# Patient Record
Sex: MALE | Race: WHITE | Employment: FULL TIME | URBAN - METROPOLITAN AREA
[De-identification: names, ages, dates, MRNs, and addresses within clinical notes are randomized per-mention and may not be internally consistent; named-entity substitution may affect disease eponyms.]

---

## 2021-05-22 ENCOUNTER — HOSPITAL ENCOUNTER (EMERGENCY)
Age: 31
Discharge: HOME | End: 2021-05-22
Attending: EMERGENCY MEDICINE
Payer: COMMERCIAL

## 2021-05-22 VITALS
TEMPERATURE: 97.6 F | OXYGEN SATURATION: 97 % | RESPIRATION RATE: 14 BRPM | HEART RATE: 98 BPM | SYSTOLIC BLOOD PRESSURE: 134 MMHG | WEIGHT: 220 LBS | BODY MASS INDEX: 34.53 KG/M2 | DIASTOLIC BLOOD PRESSURE: 83 MMHG | HEIGHT: 67 IN

## 2021-05-22 DIAGNOSIS — S61.411A LACERATION OF RIGHT HAND WITHOUT FOREIGN BODY, INITIAL ENCOUNTER: Primary | ICD-10-CM

## 2021-05-22 PROCEDURE — 99283 EMERGENCY DEPT VISIT LOW MDM: CPT

## 2021-05-22 PROCEDURE — 12001 RPR S/N/AX/GEN/TRNK 2.5CM/<: CPT

## 2021-05-22 PROCEDURE — 90471 IMMUNIZATION ADMIN: CPT | Performed by: EMERGENCY MEDICINE

## 2021-05-22 PROCEDURE — 90715 TDAP VACCINE 7 YRS/> IM: CPT | Performed by: EMERGENCY MEDICINE

## 2021-05-22 PROCEDURE — 6360000002 HC RX W HCPCS: Performed by: EMERGENCY MEDICINE

## 2021-05-22 RX ORDER — LIDOCAINE HYDROCHLORIDE AND EPINEPHRINE BITARTRATE 20; .01 MG/ML; MG/ML
20 INJECTION, SOLUTION SUBCUTANEOUS ONCE
Status: DISCONTINUED | OUTPATIENT
Start: 2021-05-22 | End: 2021-05-22 | Stop reason: HOSPADM

## 2021-05-22 RX ADMIN — TETANUS TOXOID, REDUCED DIPHTHERIA TOXOID AND ACELLULAR PERTUSSIS VACCINE, ADSORBED 0.5 ML: 5; 2.5; 8; 8; 2.5 SUSPENSION INTRAMUSCULAR at 21:27

## 2021-05-22 ASSESSMENT — PAIN DESCRIPTION - PAIN TYPE: TYPE: ACUTE PAIN

## 2021-05-23 NOTE — ED PROVIDER NOTES
Triage Chief Complaint:   Laceration (right hand)      NEERAJ Celis is a 26 y.o. male that presents to the emergency department with a laceration to his right hand. It is on the dorsal aspect of his hand between his first and second digit. Approximately 0.5 cm. He states he needs an updated tetanus as his is been greater than 10 years. Not on any blood thinners. Bleeding is controlled. No other injuries. States mild burning pain to area, 2 out of 10. States he was showing someone how to shoot a gun when the barrel came back and hit his hand. .    Past Medical History:   Diagnosis Date    Hodgkin lymphoma [facility])      Past Surgical History:   Procedure Laterality Date    LYMPH NODE BIOPSY       History reviewed. No pertinent family history. Social History     Socioeconomic History    Marital status: Not on file     Spouse name: Not on file    Number of children: Not on file    Years of education: Not on file    Highest education level: Not on file   Occupational History    Not on file   Tobacco Use    Smoking status: Never Smoker    Smokeless tobacco: Never Used   Substance and Sexual Activity    Alcohol use: Not Currently    Drug use: Never    Sexual activity: Not on file   Other Topics Concern    Not on file   Social History Narrative    Not on file     Social Determinants of Health     Financial Resource Strain:     Difficulty of Paying Living Expenses:    Food Insecurity:     Worried About Running Out of Food in the Last Year:     [address] in the Last Year:    Transportation Needs:     Lack of Transportation (Medical):      Lack of Transportation (Non-Medical):    Physical Activity:     Days of Exercise per Week:     Minutes of Exercise per Session:    Stress:     Feeling of Stress :    Social Connections:     Frequency of Communication with Friends and Family:     Frequency of Social Gatherings with Friends and Family:     Attends Yazdan[employer] Services:     Active Member of Clubs or Organizations:     Attends Club or Organization Meetings:     Marital Status:    Intimate Partner Violence:     Fear of Current or Ex-Partner:     Emotionally Abused:     Physically Abused:     Sexually Abused:      Current Facility-Administered Medications   Medication Dose Route Frequency Provider Last Rate Last Admin    Tetanus-Diphth-Acell Pertussis (BOOSTRIX) injection 0.5 mL  0.5 mL Intramuscular Once Yuliana Cole MD        lidocaine-EPINEPHrine 2 [id number] injection 20 mL  20 mL Other Once Yuliana Cole MD         No current outpatient medications on file. No Known Allergies  Nursing Notes Reviewed    ROS:  At least 10 systems reviewed and otherwise negative except as in the [address] Physical Exam:  ED Triage Vitals [05/22/21 2108]   Enc Vitals Group      BP       Pulse 98      Resp 14      Temp 97.6 °F (36.4 °C)      Temp Source Infrared      SpO2 97 %      Weight 220 lb (99.8 kg)      Height 5' 7\" (1.702 m)      Head Circumference       Peak Flow       Pain Score       Pain Loc       Pain Edu? Excl. in [address]? My pulse oximetry interpretation is which is within the normal range    GENERAL APPEARANCE: Awake and alert. Cooperative. No acute distress. HEAD: Normocephalic. Atraumatic. EYES: EOM's grossly intact. Sclera anicteric. ENT: Mucous membranes are moist. Tolerates saliva. No trismus. NECK: Supple. No meningismus. Trachea midline. HEART: RRR. Radial pulses 2+. LUNGS: Respirations unlabored. CTAB  ABDOMEN: Soft. Non-tender. No guarding or rebound. EXTREMITIES: 0.5 cm laceration to the volar aspect of right hand between first and second digits. No active bleeding. Good cap refill. Positive pulses. No foreign body. SKIN: Warm and dry. NEUROLOGICAL: No gross facial drooping. Moves all 4 extremities spontaneously. PSYCHIATRIC: Normal mood.     I have reviewed and interpreted all of the currently available lab results from this visit (if applicable):  No results found for this visit on 05/22/21. Radiographs:  [] Radiologist's Wet Read Report Reviewed:     [] Discussed with Radiologist:     [] The following radiograph was interpreted by myself in the absence of a radiologist:     EKG: (All EKG's are interpreted by myself in the absence of a cardiologist)      The risks and benefits of laceration repair were discussed with the patient. Questions were sought and answered and verbal consent was given for the procedure. The area was prepped and draped in standard bedside fashion. The wound area was anesthetized with 1ml of Lidocaine 2% with epinephrine without added sodium bicarbonate. The wound was explored with No foreign bodies found. The wound was repaired with 5-0 Prolene; 2 simple interrupted sutures were used. The patient tolerated the procedure well without complications and my repeat neurovascular exam post-procedure is unchanged. Wound care and scar minimization education was provided. Instructions were given to return for increasing pain, redness, streaking, discharge, or any other worsening or worrisome concerns. MDM:  Patient's vital signs are stable. Patient does have a laceration that was repaired as above. Tetanus shot updated. Return in 7 to 10 days to have sutures removed. Clinical Impression:  1.  Laceration of right hand without foreign body, initial encounter        Disposition Vitals:  [unfilled], [unfilled], [unfilled], [unfilled]    Disposition referral (if applicable):  [address]  [phone]  In 1 week  For suture removal      Disposition medications (if applicable):  New Prescriptions    No medications on file         (Please note that portions of this note may have been completed with a voice recognition program. Efforts were made to edit the dictations but occasionally words are mis-transcribed.)    Leopoldo Anderson, MD Vandy Rand Corian Casillas MD  05/22/21 2122